# Patient Record
Sex: MALE | Race: WHITE | NOT HISPANIC OR LATINO | URBAN - METROPOLITAN AREA
[De-identification: names, ages, dates, MRNs, and addresses within clinical notes are randomized per-mention and may not be internally consistent; named-entity substitution may affect disease eponyms.]

---

## 2018-01-10 ENCOUNTER — OFFICE VISIT - HEALTHEAST (OUTPATIENT)
Dept: INTERNAL MEDICINE | Facility: CLINIC | Age: 23
End: 2018-01-10

## 2018-01-10 DIAGNOSIS — J45.909 ASTHMA: ICD-10-CM

## 2018-01-10 DIAGNOSIS — Z00.00 HEALTHCARE MAINTENANCE: ICD-10-CM

## 2018-01-10 RX ORDER — LEVALBUTEROL TARTRATE 45 UG/1
1-2 AEROSOL, METERED ORAL EVERY 4 HOURS PRN
Status: SHIPPED | COMMUNITY
Start: 2018-01-10

## 2018-01-10 ASSESSMENT — MIFFLIN-ST. JEOR: SCORE: 1739.32

## 2018-01-15 ENCOUNTER — AMBULATORY - HEALTHEAST (OUTPATIENT)
Dept: LAB | Facility: CLINIC | Age: 23
End: 2018-01-15

## 2018-01-15 DIAGNOSIS — Z00.00 HEALTHCARE MAINTENANCE: ICD-10-CM

## 2018-01-15 LAB
ALBUMIN SERPL-MCNC: 4.4 G/DL (ref 3.5–5)
ALP SERPL-CCNC: 66 U/L (ref 45–120)
ALT SERPL W P-5'-P-CCNC: 23 U/L (ref 0–45)
ANION GAP SERPL CALCULATED.3IONS-SCNC: 11 MMOL/L (ref 5–18)
AST SERPL W P-5'-P-CCNC: 21 U/L (ref 0–40)
BILIRUB SERPL-MCNC: 0.7 MG/DL (ref 0–1)
BUN SERPL-MCNC: 16 MG/DL (ref 8–22)
CALCIUM SERPL-MCNC: 9.8 MG/DL (ref 8.5–10.5)
CHLORIDE BLD-SCNC: 104 MMOL/L (ref 98–107)
CHOLEST SERPL-MCNC: 197 MG/DL
CO2 SERPL-SCNC: 26 MMOL/L (ref 22–31)
CREAT SERPL-MCNC: 0.98 MG/DL (ref 0.7–1.3)
FASTING STATUS PATIENT QL REPORTED: YES
GFR SERPL CREATININE-BSD FRML MDRD: >60 ML/MIN/1.73M2
GLUCOSE BLD-MCNC: 85 MG/DL (ref 70–125)
HDLC SERPL-MCNC: 57 MG/DL
LDLC SERPL CALC-MCNC: 123 MG/DL
POTASSIUM BLD-SCNC: 3.9 MMOL/L (ref 3.5–5)
PROT SERPL-MCNC: 7.9 G/DL (ref 6–8)
SODIUM SERPL-SCNC: 141 MMOL/L (ref 136–145)
TRIGL SERPL-MCNC: 84 MG/DL

## 2018-01-17 ENCOUNTER — COMMUNICATION - HEALTHEAST (OUTPATIENT)
Dept: INTERNAL MEDICINE | Facility: CLINIC | Age: 23
End: 2018-01-17

## 2018-02-15 ENCOUNTER — OFFICE VISIT - HEALTHEAST (OUTPATIENT)
Dept: INTERNAL MEDICINE | Facility: CLINIC | Age: 23
End: 2018-02-15

## 2018-02-15 DIAGNOSIS — J45.20 MILD INTERMITTENT ASTHMA WITHOUT COMPLICATION: ICD-10-CM

## 2018-02-15 DIAGNOSIS — K21.9 ACID REFLUX: ICD-10-CM

## 2018-02-15 ASSESSMENT — MIFFLIN-ST. JEOR: SCORE: 1712.11

## 2018-02-28 ENCOUNTER — RECORDS - HEALTHEAST (OUTPATIENT)
Dept: ADMINISTRATIVE | Facility: OTHER | Age: 23
End: 2018-02-28

## 2021-05-31 VITALS — HEIGHT: 72 IN | WEIGHT: 161 LBS | BODY MASS INDEX: 21.81 KG/M2

## 2021-06-01 VITALS — BODY MASS INDEX: 20.99 KG/M2 | HEIGHT: 72 IN | WEIGHT: 155 LBS

## 2021-06-15 NOTE — PROGRESS NOTES
[facility] Follow Up Note    Scott Harrell   22 y.o. male    Date of Visit: 1/10/2018    Chief Complaint   Patient presents with     [facility]     Annual Exam     not fasting     Subjective  This is the first visit to our office for this 22-year-old young man.  He comes in to [facility] and have a physical examination.  He has generally been in good health.  He does have a history of some mild asthma and keeps an inhaler on hand but has not used it very often.  Cold is sometimes a trigger for his asthma attacks so he does not run or walk in the cold weather.  He has a bit of a complaint of heartburn at this time off and on.  He does not report any specific food intolerances and there is no particular pattern to this.  It seems to have been worse within the past few weeks.  He describes it as more of a mild inconvenience than a serious problem.  No other complaints today he is not on any prescription medications on a regular basis.    Past medical history: No prior surgery.  He does have the history of asthma.  He talks about having some frequent infections over the past few years but this seems to be improving.  No known drug allergies.    Social history: The patient is  with no children.  He is not a smoker.  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  He is originally from [place] and his wife is a [place] person.  He tells me that his company may transfer many year or so so he may not be prolonged period of time.    Family history: His parents are both around 55 years of age.  His mother has a history of colitis.  He has 1 sister and 1 brother and both are in good health.  There is some family history of diabetes but none in the immediate family.    ROS A comprehensive review of systems was performed and was otherwise negative    Medications, allergies, and problem list were reviewed and updated    Exam  General Appearance:    On examination his blood pressure was 122/64.  Weight is 161 pounds and height is 72 inches.  BMI is 21.99.    Eyes: Pupils are equal and conjunctiva are normal.    Ears nose and throat: External ears canals and tympanic membranes are normal.  Nose and throat are normal.    Neck: Supple with no masses and no neck vein distention.  No thyroid enlargement.    Lungs: Clear.    Cardiovascular: Heart is in sinus rhythm with a rate of 78 and no ectopy.  No gallops or murmurs.  Carotid pulses are full.  No peripheral edema.    GI: Abdomen is soft and nontender with no distention.  No masses or organomegaly.    Musculoskeletal: Head and neck are normal to inspection with good range of motion.  Good strength and range of motion in all 4 extremities.    Neurologic: Cranial nerves are intact.  Gait is normal.    Psychiatric: The patient is alert and oriented ×3.  Mood and affect are appropriate.      Assessment/Plan  1. Healthcare maintenance  Lipid Luna    Comprehensive Metabolic Panel   2. Asthma       This would appear to be a healthy young man.  His asthma seems to be fairly mild and under good control at this time.  We talked about the possibility of some mild reflux.  If he continues to have some symptoms he may try some over-the-counter Prilosec or Pepcid.  He will follow-up with me if there continues to be an issue.  He is not fasting but we will put in orders for CMP and lipids for screening purposes.  He will make a lab appointment.      Angel Toney MD      No current outpatient prescriptions on file prior to visit.     No current facility-administered medications on file prior to visit.      No Known Allergies  Social History   Substance Use Topics     Smoking status: Never Smoker     Smokeless tobacco: Never Used     Alcohol use No

## 2021-06-16 NOTE — PROGRESS NOTES
"  Office Visit - Follow Up   Scott Harrell   22 y.o. male    Date of Visit: 2/15/2018    Chief Complaint   Patient presents with     Gastroesophageal Reflux        Assessment and Plan   1. Acid reflux  We discussed differential at this point his gastroesophageal reflux disease versus eosinophilic esophagitis.  I offered to order an endoscopy and then to see GI versus seeing GI first.  He is interested in consultation.  I suggested he increase omeprazole to twice daily or even increase the potency but he declined.  He has instituted numerous modifications including no alcohol, no caffeine and elevating the head of his bed.  - Ambulatory referral to Gastroenterology    2. Mild intermittent asthma without complication    Return in about 4 weeks (around 3/15/2018) for follow up with PCP.     History of Present Illness   This 22 y.o. old and comes in for follow-up of acid reflux.  He saw Dr. Angel Tonye and was advised to take omeprazole.  He took this for 2 weeks and noticed no improvement in his symptoms.  In fact he had worsening abdominal pain.  He does have occasional chest pain.  Occasional dysphasia.  He has never had a food impaction.  He has chronic cough and throat clearing.  He has chronic acidic taste in the back of his mouth.  He has chronic heartburn.  This is been going on long term.  He has a history of asthma.    Review of Systems: A comprehensive review of systems was negative except as noted.     Medications, Allergies and Problem List   Reviewed and updated     Physical Exam   General Appearance:   No acute distress    /72 (Patient Site: Right Arm, Patient Position: Sitting, Cuff Size: Adult Regular)  Pulse 89  Ht 5' 11.75\" (1.822 m)  Wt 155 lb (70.3 kg)  SpO2 97%  BMI 21.17 kg/m2    Abdomen soft     Additional Information   Current Outpatient Prescriptions   Medication Sig Dispense Refill     cholecalciferol, vitamin D3, (VITAMIN D3) 2,000 unit Tab Take by mouth.       levalbuterol " (XOPENEX HFA) 45 mcg/actuation inhaler Inhale 1-2 puffs every 4 (four) hours as needed for wheezing.       zinc 50 mg Tab Take by mouth.       No current facility-administered medications for this visit.      No Known Allergies  Social History   Substance Use Topics     Smoking status: Never Smoker     Smokeless tobacco: Never Used     Alcohol use No       Review and/or order of clinical lab tests:  Review and/or order of radiology tests:  Review and/or order of medicine tests:  Discussion of test results with performing physician:  Decision to obtain old records and/or obtain history from someone other than the patient:  Review and summarization of old records and/or obtaining history from someone other than the patient and.or discussion of case with another health care provider:  Independent visualization of image, tracing or specimen itself:    Time:      Angel Badillo MD